# Patient Record
Sex: FEMALE | Race: BLACK OR AFRICAN AMERICAN | NOT HISPANIC OR LATINO | Employment: OTHER | URBAN - METROPOLITAN AREA
[De-identification: names, ages, dates, MRNs, and addresses within clinical notes are randomized per-mention and may not be internally consistent; named-entity substitution may affect disease eponyms.]

---

## 2017-08-07 ENCOUNTER — HOSPITAL ENCOUNTER (OUTPATIENT)
Dept: RADIOLOGY | Facility: HOSPITAL | Age: 82
Discharge: HOME | End: 2017-08-07
Attending: NURSE PRACTITIONER
Payer: MEDICARE

## 2017-08-07 ENCOUNTER — OFFICE VISIT (OUTPATIENT)
Dept: URGENT CARE | Facility: CLINIC | Age: 82
End: 2017-08-07
Payer: MEDICARE

## 2017-08-07 VITALS
OXYGEN SATURATION: 98 % | WEIGHT: 133.38 LBS | HEART RATE: 77 BPM | DIASTOLIC BLOOD PRESSURE: 80 MMHG | SYSTOLIC BLOOD PRESSURE: 140 MMHG | TEMPERATURE: 99 F

## 2017-08-07 DIAGNOSIS — M25.511 ACUTE PAIN OF RIGHT SHOULDER: ICD-10-CM

## 2017-08-07 DIAGNOSIS — M25.511 ACUTE PAIN OF RIGHT SHOULDER: Primary | ICD-10-CM

## 2017-08-07 PROCEDURE — 1125F AMNT PAIN NOTED PAIN PRSNT: CPT | Mod: S$GLB,,, | Performed by: NURSE PRACTITIONER

## 2017-08-07 PROCEDURE — 73030 X-RAY EXAM OF SHOULDER: CPT | Mod: 26,RT,, | Performed by: RADIOLOGY

## 2017-08-07 PROCEDURE — 1159F MED LIST DOCD IN RCRD: CPT | Mod: S$GLB,,, | Performed by: NURSE PRACTITIONER

## 2017-08-07 PROCEDURE — 99999 PR PBB SHADOW E&M-EST. PATIENT-LVL III: CPT | Mod: PBBFAC,,, | Performed by: NURSE PRACTITIONER

## 2017-08-07 PROCEDURE — 99214 OFFICE O/P EST MOD 30 MIN: CPT | Mod: 25,S$GLB,, | Performed by: NURSE PRACTITIONER

## 2017-08-07 PROCEDURE — 73030 X-RAY EXAM OF SHOULDER: CPT | Mod: TC,PO,RT

## 2017-08-07 PROCEDURE — 96372 THER/PROPH/DIAG INJ SC/IM: CPT | Mod: S$GLB,,, | Performed by: NURSE PRACTITIONER

## 2017-08-07 RX ORDER — NAPROXEN 500 MG/1
500 TABLET ORAL 2 TIMES DAILY WITH MEALS
Qty: 20 TABLET | Refills: 0 | Status: SHIPPED | OUTPATIENT
Start: 2017-08-07 | End: 2017-09-11

## 2017-08-07 RX ORDER — KETOROLAC TROMETHAMINE 30 MG/ML
30 INJECTION, SOLUTION INTRAMUSCULAR; INTRAVENOUS
Status: COMPLETED | OUTPATIENT
Start: 2017-08-07 | End: 2017-08-07

## 2017-08-07 RX ORDER — ERGOCALCIFEROL 1.25 MG/1
50000 CAPSULE ORAL
COMMUNITY
Start: 2017-01-11 | End: 2017-09-11

## 2017-08-07 RX ADMIN — KETOROLAC TROMETHAMINE 30 MG: 30 INJECTION, SOLUTION INTRAMUSCULAR; INTRAVENOUS at 01:08

## 2017-08-07 NOTE — PATIENT INSTRUCTIONS
Arthralgia    Arthralgia is the term for pain in or around the joint. It is a symptom, not a disease. This pain may involve one or more joints. In some cases, the pain moves from joint to joint.  There are many causes for joint pain. These include:  · Injury  · Osteoarthritis (wearing out of the joint surface)  · Gout (inflammation of the joint due to crystals in the joint fluid)  · Infection inside the joint    · Bursitis (inflammation of the fluid-filled sacs around the joint)  · Autoimmune disorders such as rheumatoid arthritis or lupus  · Tendonitis (inflamation of chords that attach muscle to bone)  Home care  · Rest the involved joint(s) until your symptoms improve.   · You may be prescribed pain medication. If none is prescribed, you may use acetaminophen or ibuprofen to control pain and inflammation.  Follow up  Follow up with your healthcare provider or our staff as advised.  When to seek medical care  Contact your healthcare provider right away if any of the following occurs:  · Pain, swelling, or redness of joint increases  · Pain worsens or recurs after a period of improvement  · Pain moves to other joints  · You cannot bear weight on the affected joint   · You cannot move the affected joint  · Joint appears deformed  · New rash appears  · Fever of 101ºF (38.8ºC) or higher, or as directed by your healthcare provider  Date Last Reviewed: 4/26/2015  © 6012-1393 [employer]. [address] [place] [address], [place] [address]. All rights reserved. This information is not intended as a substitute for professional medical care. Always follow your healthcare professional's instructions.

## 2017-08-07 NOTE — PROGRESS NOTES
Subjective:       Patient ID: Pattie Diaz is a 84 y.o. female.    Chief Complaint: Shoulder Pain    Pt is an 84 year old female to clinic today with complaints of right shoulder pain that has been intermittent times 2-3 months. Pt denies any mechanism of injury.       Shoulder Pain    The pain is present in the right shoulder. This is a new problem. The current episode started more than 1 month ago. There has been no history of extremity trauma. The problem occurs intermittently. The problem has been waxing and waning. The quality of the pain is described as aching. The pain is at a severity of 10/10. The pain is severe. Pertinent negatives include no fever, headaches, inability to bear weight, itching, joint locking, joint swelling, limited range of motion, numbness, stiffness, tingling or visual symptoms. The symptoms are aggravated by activity. She has tried acetaminophen for the symptoms. The treatment provided moderate relief. Family history includes arthritis. There is no history of Injuries to Extremity.     Review of Systems   Constitutional: Negative for chills, diaphoresis, fatigue and fever.   HENT: Negative for congestion, sinus pressure and sore throat.    Eyes: Negative for pain.   Respiratory: Negative for cough, chest tightness, shortness of breath and wheezing.    Cardiovascular: Negative for chest pain and palpitations.   Gastrointestinal: Negative for abdominal pain, diarrhea, nausea and vomiting.   Genitourinary: Negative for dysuria.   Musculoskeletal: Positive for arthralgias (right shoulder). Negative for joint swelling, myalgias, neck pain and stiffness.   Skin: Negative for itching and rash.   Neurological: Negative for dizziness, tingling, facial asymmetry, numbness and headaches.       Objective:      Physical Exam   Constitutional: She is oriented to person, place, and time. She appears well-developed and well-nourished. No distress.   HENT:   Head: Normocephalic.   Right Ear:  External ear normal.   Left Ear: External ear normal.   Nose: Nose normal.   Eyes: Pupils are equal, round, and reactive to light.   Musculoskeletal: Normal range of motion. She exhibits tenderness. She exhibits no edema or deformity.        Right shoulder: She exhibits tenderness and pain. She exhibits normal range of motion, no bony tenderness, no swelling, no effusion, no crepitus, no deformity, no laceration, no spasm, normal pulse and normal strength.   Neurological: She is alert and oriented to person, place, and time.   Skin: Skin is warm and dry. No rash noted. She is not diaphoretic.   Psychiatric: She has a normal mood and affect. Her speech is normal and behavior is normal.   Nursing note and vitals reviewed.      Normal strength with internal/external rotation against resistance.  Negative empty can test.  Negative drop test.   strength 5/5.  Negative high ridding clavicle.  Normal radial, median, and ulnar nerves against resistance.    Assessment:       1. Acute pain of right shoulder        Plan:   Acute pain of right shoulder  -     X-ray Shoulder 2 or More Views Right; Future; Expected date: 08/07/2017  -     ketorolac injection 30 mg; Inject 30 mg into the muscle one time.  -     naproxen (EC NAPROSYN) 500 MG EC tablet; Take 1 tablet (500 mg total) by mouth 2 (two) times daily with meals.  Dispense: 20 tablet; Refill: 0      Recommend RICE method.  Will notify of abnormal xray results.  Consider ortho referral if pain continues.  Start naproxen tomorrow.    Follow prescribed treatment plan as directed.  Stay hydrated and rest.  Report to ER if symptoms worsen.  Follow up with PCP in 2-3 days or sooner if symptoms do not improve.

## 2017-08-18 ENCOUNTER — TELEPHONE (OUTPATIENT)
Dept: URGENT CARE | Facility: CLINIC | Age: 82
End: 2017-08-18

## 2017-08-18 NOTE — TELEPHONE ENCOUNTER
----- Message from Siri Badillo sent at 8/18/2017  9:17 AM CDT -----  Contact: Sonja - daughter  Patient was in the urgent care clinic in [place] on August 7 and got an injection in her shoulder, but she is still in pain and wants to know if you could call something in for the pain to [facility] in [place].  Call daughter at [phone].                                          kay

## 2017-09-11 ENCOUNTER — OFFICE VISIT (OUTPATIENT)
Dept: INTERNAL MEDICINE | Facility: CLINIC | Age: 82
End: 2017-09-11
Payer: MEDICARE

## 2017-09-11 ENCOUNTER — LAB VISIT (OUTPATIENT)
Dept: LAB | Facility: HOSPITAL | Age: 82
End: 2017-09-11
Attending: FAMILY MEDICINE
Payer: MEDICARE

## 2017-09-11 VITALS
HEIGHT: 65 IN | DIASTOLIC BLOOD PRESSURE: 62 MMHG | SYSTOLIC BLOOD PRESSURE: 130 MMHG | TEMPERATURE: 99 F | BODY MASS INDEX: 21.6 KG/M2 | WEIGHT: 129.63 LBS

## 2017-09-11 DIAGNOSIS — Z13.820 SPECIAL SCREENING FOR OSTEOPOROSIS: ICD-10-CM

## 2017-09-11 DIAGNOSIS — Z12.31 ENCOUNTER FOR SCREENING MAMMOGRAM FOR BREAST CANCER: ICD-10-CM

## 2017-09-11 DIAGNOSIS — R63.4 WEIGHT LOSS: ICD-10-CM

## 2017-09-11 DIAGNOSIS — M25.511 CHRONIC RIGHT SHOULDER PAIN: Primary | ICD-10-CM

## 2017-09-11 DIAGNOSIS — E55.9 VITAMIN D DEFICIENCY: ICD-10-CM

## 2017-09-11 DIAGNOSIS — G89.29 CHRONIC RIGHT SHOULDER PAIN: Primary | ICD-10-CM

## 2017-09-11 LAB
25(OH)D3+25(OH)D2 SERPL-MCNC: 17 NG/ML
ALBUMIN SERPL BCP-MCNC: 3.4 G/DL
ALP SERPL-CCNC: 83 U/L
ALT SERPL W/O P-5'-P-CCNC: 9 U/L
ANION GAP SERPL CALC-SCNC: 11 MMOL/L
AST SERPL-CCNC: 13 U/L
BASOPHILS # BLD AUTO: 0.02 K/UL
BASOPHILS NFR BLD: 0.2 %
BILIRUB SERPL-MCNC: 0.6 MG/DL
BUN SERPL-MCNC: 6 MG/DL
CALCIUM SERPL-MCNC: 9.4 MG/DL
CHLORIDE SERPL-SCNC: 103 MMOL/L
CO2 SERPL-SCNC: 28 MMOL/L
CREAT SERPL-MCNC: 0.8 MG/DL
DIFFERENTIAL METHOD: ABNORMAL
EOSINOPHIL # BLD AUTO: 0 K/UL
EOSINOPHIL NFR BLD: 0.3 %
ERYTHROCYTE [DISTWIDTH] IN BLOOD BY AUTOMATED COUNT: 13.7 %
EST. GFR  (AFRICAN AMERICAN): >60 ML/MIN/1.73 M^2
EST. GFR  (NON AFRICAN AMERICAN): >60 ML/MIN/1.73 M^2
FERRITIN SERPL-MCNC: 308 NG/ML
GLUCOSE SERPL-MCNC: 105 MG/DL
HCT VFR BLD AUTO: 40.6 %
HGB BLD-MCNC: 13 G/DL
LYMPHOCYTES # BLD AUTO: 1.9 K/UL
LYMPHOCYTES NFR BLD: 21.8 %
MCH RBC QN AUTO: 30.4 PG
MCHC RBC AUTO-ENTMCNC: 32 G/DL
MCV RBC AUTO: 95 FL
MONOCYTES # BLD AUTO: 0.4 K/UL
MONOCYTES NFR BLD: 4.2 %
NEUTROPHILS # BLD AUTO: 6.3 K/UL
NEUTROPHILS NFR BLD: 73.3 %
PLATELET # BLD AUTO: 326 K/UL
PMV BLD AUTO: 9.5 FL
POTASSIUM SERPL-SCNC: 3.7 MMOL/L
PROT SERPL-MCNC: 7 G/DL
RBC # BLD AUTO: 4.28 M/UL
SODIUM SERPL-SCNC: 142 MMOL/L
TSH SERPL DL<=0.005 MIU/L-ACNC: 1.04 UIU/ML
WBC # BLD AUTO: 8.63 K/UL

## 2017-09-11 PROCEDURE — 36415 COLL VENOUS BLD VENIPUNCTURE: CPT | Mod: PO

## 2017-09-11 PROCEDURE — 99214 OFFICE O/P EST MOD 30 MIN: CPT | Mod: S$GLB,,, | Performed by: FAMILY MEDICINE

## 2017-09-11 PROCEDURE — 1159F MED LIST DOCD IN RCRD: CPT | Mod: S$GLB,,, | Performed by: FAMILY MEDICINE

## 2017-09-11 PROCEDURE — 80053 COMPREHEN METABOLIC PANEL: CPT

## 2017-09-11 PROCEDURE — 82728 ASSAY OF FERRITIN: CPT

## 2017-09-11 PROCEDURE — 1126F AMNT PAIN NOTED NONE PRSNT: CPT | Mod: S$GLB,,, | Performed by: FAMILY MEDICINE

## 2017-09-11 PROCEDURE — 85025 COMPLETE CBC W/AUTO DIFF WBC: CPT

## 2017-09-11 PROCEDURE — 99999 PR PBB SHADOW E&M-EST. PATIENT-LVL III: CPT | Mod: PBBFAC,,, | Performed by: FAMILY MEDICINE

## 2017-09-11 PROCEDURE — 84443 ASSAY THYROID STIM HORMONE: CPT

## 2017-09-11 PROCEDURE — 82306 VITAMIN D 25 HYDROXY: CPT

## 2017-09-11 PROCEDURE — 3008F BODY MASS INDEX DOCD: CPT | Mod: S$GLB,,, | Performed by: FAMILY MEDICINE

## 2017-09-11 RX ORDER — MELOXICAM 15 MG/1
15 TABLET ORAL DAILY PRN
Qty: 30 TABLET | Refills: 1 | Status: SHIPPED | OUTPATIENT
Start: 2017-09-11

## 2017-09-11 RX ORDER — NAPROXEN SODIUM 220 MG
220 TABLET ORAL DAILY PRN
COMMUNITY
End: 2017-09-11

## 2017-09-11 NOTE — PROGRESS NOTES
"Subjective:      Patient ID: Pattie Diaz is a 84 y.o. female.    Chief Complaint: Shoulder Pain and Hand Pain    HPI  83 yo female with hx of OA, vit D def here with daughter for first time.  Somewhat of a struggle to gather a good history between both of them?  Both reports that pt has no memory deficits/problems, but I find this doubtful.  Per pt, having a lot of R shoulder pain down to the hand.  Been present for years.  No trauma/fall/injury that she can recall.  Using naproxen and aspercream.  Limited with ROM in this arm, it is her dominant.  Seen in UC here, had imaging of the shoulder.  Shows extensive degenerative changes.  Pt has not seen Ortho in a long while.  Per daughter, some weight loss.  Pt only likes to eat 1-2 times a day.  Normal BMs.    Unsure about last mammo or DEXA  Only been on naproxen and Centrum.  I can view outside records//physical/labs done Jan 2017.  Weight almost 20 lbs heavier at that time, 148lbs.    Past Medical History:   Diagnosis Date    Nephrolithiasis     OA (osteoarthritis) of shoulder      Family History   Problem Relation Age of Onset    Diabetes Neg Hx     Heart disease Neg Hx     Breast cancer Neg Hx     Colon cancer Neg Hx      Past Surgical History:   Procedure Laterality Date    KIDNEY STONE SURGERY       Social History   Substance Use Topics    Smoking status: Never Smoker    Smokeless tobacco: Never Used    Alcohol use No       /62 (BP Location: Right arm, Patient Position: Sitting, BP Method: Medium (Manual))   Temp 99.1 °F (37.3 °C) (Tympanic)   Ht 5' 5" (1.651 m)   Wt 58.8 kg (129 lb 10.1 oz)   BMI 21.57 kg/m²     Review of Systems   Constitutional: Positive for activity change, appetite change and unexpected weight change. Negative for chills, diaphoresis, fatigue and fever.   HENT: Negative.    Respiratory: Negative for cough, shortness of breath and wheezing.    Cardiovascular: Negative for chest pain, palpitations and leg " swelling.   Gastrointestinal: Negative for abdominal distention, abdominal pain, constipation and diarrhea.   Endocrine: Negative.    Musculoskeletal: Positive for arthralgias.   Skin: Negative.    Neurological: Negative.    Psychiatric/Behavioral: Negative.      Objective:     Physical Exam   Constitutional: She appears well-developed and well-nourished. No distress.   HENT:   Right Ear: External ear normal.   Left Ear: External ear normal.   Nose: Nose normal.   Mouth/Throat: Oropharynx is clear and moist.   Eyes: Pupils are equal, round, and reactive to light.   Neck: Normal range of motion. Neck supple.   Cardiovascular: Normal rate and normal heart sounds.  An irregular rhythm present.   Pulmonary/Chest: Effort normal and breath sounds normal. No respiratory distress. She has no wheezes. She has no rales.   Abdominal: Soft. Bowel sounds are normal. She exhibits no distension. There is no tenderness.   Musculoskeletal: She exhibits no edema.        Right shoulder: She exhibits decreased range of motion and bony tenderness.   Lymphadenopathy:     She has no cervical adenopathy.   Neurological: She is alert. No cranial nerve deficit.   Skin: Skin is warm and dry. No rash noted.   Nursing note and vitals reviewed.      Lab Results   Component Value Date    CHOL 215 (H) 01/11/2007    TRIG 118 01/11/2007    HDL 86.0 (H) 01/11/2007    ALT 13 01/11/2007    AST 12 01/11/2007     01/11/2007    K 3.8 01/11/2007     01/11/2007    CREATININE 0.8 01/11/2007    BUN 11 01/11/2007    CO2 25 01/11/2007       Assessment:     1. Chronic right shoulder pain    2. Encounter for screening mammogram for breast cancer    3. Special screening for osteoporosis    4. Vitamin D deficiency    5. Weight loss       Plan:   Chronic right shoulder pain  -     Ambulatory referral to Orthopedics    Encounter for screening mammogram for breast cancer  -     Mammo Digital Screening Bilat with CAD; Future; Expected date:  09/11/2017    Special screening for osteoporosis  -     DXA Bone Density Spine And Hip; Future; Expected date: 09/11/2017    Vitamin D deficiency  -     Vitamin D; Future; Expected date: 09/11/2017    Weight loss  -     CBC auto differential; Future; Expected date: 09/11/2017  -     Ferritin; Future; Expected date: 09/11/2017  -     Comprehensive metabolic panel; Future; Expected date: 09/11/2017  -     TSH; Future; Expected date: 09/11/2017    Other orders  -     meloxicam (MOBIC) 15 MG tablet; Take 1 tablet (15 mg total) by mouth daily as needed for Pain.  Dispense: 30 tablet; Refill: 1    Reviewed xrays/outside records  Recommend Ortho  Start mobic 15mg daily, avoid other NSAIDs.  Can use tylenol arthritis as well.  Will update mammo/DEXA  Update some labs  F/u to be determined if pt chooses to return here

## 2017-09-13 ENCOUNTER — TELEPHONE (OUTPATIENT)
Dept: INTERNAL MEDICINE | Facility: CLINIC | Age: 82
End: 2017-09-13

## 2017-09-13 RX ORDER — ERGOCALCIFEROL 1.25 MG/1
50000 CAPSULE ORAL WEEKLY
Qty: 12 CAPSULE | Refills: 0 | Status: SHIPPED | OUTPATIENT
Start: 2017-09-13 | End: 2017-10-13

## 2017-09-13 NOTE — LETTER
September 20, 2017    Pattie Diaz  [address] [facility] [address]             [facility]  [address] [facility] [address]  Phone: [phone]  Fax: [phone] Dear Ms. Diaz:    We have been unable to contact you by phone to discuss your lab results.    Please contact our office at [phone].            Sincerely,        Glenn Figueroa LPN

## 2017-09-25 ENCOUNTER — TELEPHONE (OUTPATIENT)
Dept: RADIOLOGY | Facility: HOSPITAL | Age: 82
End: 2017-09-25

## 2017-10-11 ENCOUNTER — TELEPHONE (OUTPATIENT)
Dept: INTERNAL MEDICINE | Facility: CLINIC | Age: 82
End: 2017-10-11

## 2017-10-11 NOTE — TELEPHONE ENCOUNTER
Called pt to try and reschedule her DEXA and mammogram that was due in Sept and pt's daughter said she has a kidney stone and she will call us back next month to reschedule these tests.

## 2017-11-29 ENCOUNTER — TELEPHONE (OUTPATIENT)
Dept: INTERNAL MEDICINE | Facility: CLINIC | Age: 82
End: 2017-11-29

## 2017-11-29 NOTE — TELEPHONE ENCOUNTER
Called and left message for pt to call back to reschedule her bone density test and mammogram that she cancelled in sept.